# Patient Record
Sex: MALE | Race: WHITE
[De-identification: names, ages, dates, MRNs, and addresses within clinical notes are randomized per-mention and may not be internally consistent; named-entity substitution may affect disease eponyms.]

---

## 2020-10-25 ENCOUNTER — HOSPITAL ENCOUNTER (EMERGENCY)
Age: 40
Discharge: HOME | End: 2020-10-25
Payer: COMMERCIAL

## 2020-10-25 VITALS
HEART RATE: 73 BPM | SYSTOLIC BLOOD PRESSURE: 121 MMHG | DIASTOLIC BLOOD PRESSURE: 75 MMHG | TEMPERATURE: 98.24 F | RESPIRATION RATE: 16 BRPM

## 2020-10-25 VITALS
HEART RATE: 82 BPM | RESPIRATION RATE: 18 BRPM | TEMPERATURE: 98.5 F | SYSTOLIC BLOOD PRESSURE: 138 MMHG | DIASTOLIC BLOOD PRESSURE: 80 MMHG | OXYGEN SATURATION: 98 %

## 2020-10-25 VITALS — BODY MASS INDEX: 20.9 KG/M2

## 2020-10-25 DIAGNOSIS — L23.81: Primary | ICD-10-CM

## 2020-10-25 DIAGNOSIS — B88.0: ICD-10-CM

## 2020-10-25 PROCEDURE — 99281 EMR DPT VST MAYX REQ PHY/QHP: CPT

## 2024-04-29 ENCOUNTER — HOSPITAL ENCOUNTER (EMERGENCY)
Facility: HOSPITAL | Age: 44
Discharge: HOME | End: 2024-04-30
Attending: EMERGENCY MEDICINE
Payer: MEDICAID

## 2024-04-29 VITALS
WEIGHT: 170 LBS | RESPIRATION RATE: 19 BRPM | HEART RATE: 128 BPM | DIASTOLIC BLOOD PRESSURE: 80 MMHG | OXYGEN SATURATION: 99 % | HEIGHT: 72 IN | SYSTOLIC BLOOD PRESSURE: 142 MMHG | BODY MASS INDEX: 23.03 KG/M2 | TEMPERATURE: 98.7 F

## 2024-04-29 DIAGNOSIS — T40.2X1A OPIOID OVERDOSE, ACCIDENTAL OR UNINTENTIONAL, INITIAL ENCOUNTER: Primary | ICD-10-CM

## 2024-04-29 LAB
HOLD SPECIMEN: NORMAL
WHOLE BLOOD HOLD COAG: NORMAL
WHOLE BLOOD HOLD SPECIMEN: NORMAL

## 2024-04-29 PROCEDURE — 25010000002 ONDANSETRON PER 1 MG: Performed by: EMERGENCY MEDICINE

## 2024-04-29 PROCEDURE — 93005 ELECTROCARDIOGRAM TRACING: CPT | Performed by: EMERGENCY MEDICINE

## 2024-04-29 PROCEDURE — 96374 THER/PROPH/DIAG INJ IV PUSH: CPT

## 2024-04-29 PROCEDURE — 99283 EMERGENCY DEPT VISIT LOW MDM: CPT

## 2024-04-29 RX ORDER — SODIUM CHLORIDE 0.9 % (FLUSH) 0.9 %
10 SYRINGE (ML) INJECTION AS NEEDED
Status: DISCONTINUED | OUTPATIENT
Start: 2024-04-29 | End: 2024-04-30 | Stop reason: HOSPADM

## 2024-04-29 RX ORDER — ONDANSETRON 2 MG/ML
4 INJECTION INTRAMUSCULAR; INTRAVENOUS ONCE
Status: COMPLETED | OUTPATIENT
Start: 2024-04-29 | End: 2024-04-29

## 2024-04-29 RX ADMIN — ONDANSETRON 4 MG: 2 INJECTION INTRAMUSCULAR; INTRAVENOUS at 23:42

## 2024-04-30 NOTE — ED PROVIDER NOTES
Subjective   History of Present Illness  Is a 43-year-old male presented to the emergency department with some altered mental status.  Patient apparently used some fentanyl earlier today.  He states that he is only used it once before.  States that he snorted an unknown amount.  Patient was found by police slumped over his steering wheel after that.  Patient was administered Narcan and had brisk response.  He is alert and appropriate now.  States that he did not try to harm himself.  He was using recreationally.  He is having some mild nausea, however denies any other abnormalities.  Is not have any fevers or chills.  No headache or change in vision.  No focal weakness.  No chest pain or shortness of breath.  No abdominal pain.    History provided by:  Patient and police   used: No        Review of Systems   Constitutional:  Negative for chills and fever.   HENT:  Negative for congestion, ear pain and sore throat.    Eyes:  Negative for visual disturbance.   Respiratory:  Negative for shortness of breath.    Cardiovascular:  Negative for chest pain.   Gastrointestinal:  Positive for nausea. Negative for abdominal pain.   Genitourinary:  Negative for difficulty urinating.   Musculoskeletal:  Negative for arthralgias.   Skin:  Negative for rash.   Neurological:  Negative for dizziness, weakness and numbness.   Psychiatric/Behavioral:  Negative for agitation.        History reviewed. No pertinent past medical history.    No Known Allergies    History reviewed. No pertinent surgical history.    History reviewed. No pertinent family history.    Social History     Socioeconomic History    Marital status: Single           Objective   Physical Exam  Vitals and nursing note reviewed.   Constitutional:       General: He is not in acute distress.     Appearance: He is not ill-appearing or toxic-appearing.   HENT:      Mouth/Throat:      Pharynx: No posterior oropharyngeal erythema.   Eyes:      Extraocular  Movements: Extraocular movements intact.      Pupils: Pupils are equal, round, and reactive to light.   Cardiovascular:      Rate and Rhythm: Regular rhythm. Tachycardia present.   Pulmonary:      Effort: Pulmonary effort is normal. No respiratory distress.   Abdominal:      General: Abdomen is flat. There is no distension.      Palpations: There is no mass.      Tenderness: There is no abdominal tenderness. There is no guarding or rebound.   Musculoskeletal:         General: No deformity. Normal range of motion.   Neurological:      General: No focal deficit present.      Mental Status: He is alert.      Sensory: No sensory deficit.      Motor: No weakness.         ECG 12 Lead      Date/Time: 4/30/2024 12:32 AM    Performed by: Mario Rivas MD  Authorized by: Mario Rivas MD  Interpreted by ED physician  Previous ECG: no previous ECG available  Rhythm: sinus tachycardia  Rate: tachycardic  BPM: 114  QRS axis: normal  Conduction: conduction normal  Other findings comments: Nonspecific ST changes  Clinical impression: non-specific ECG  Comments: Interpretation:  EKG was directly visualized by myself, interpretations as documented in hospital course.               ED Course  ED Course as of 04/30/24 0035 Tue Apr 30, 2024   0034 BP: 142/80 [JK]   0034 Temp: 98.7 °F (37.1 °C) [JK]   0034 Temp src: Oral [JK]   0034 Heart Rate(!): 128 [JK]   0034 Resp: 19 [JK]   0034 SpO2: 99 %  Interpretation:  Patient's repeat vitals, telemetry tracing, and pulse oximetry tracing were directly viewed and interpreted by myself.  Sinus tachycardia [JK]   0034 On reevaluation, the patient is now hemodynamically stable.  Repeat neuroexam is normal.  Patient ambulatory.  We did have discussion about possible rehabilitation and the dangers of substance abuse.  I did offer evaluation by behavioral services.  Patient declines.  He is alert and appropriate at this time.  Full decision-making capability.  Patient be discharged  in stable condition.  Given strict return precautions.  Verbalized understanding. [JK]   0035 I had a discussion with the patient/family regarding diagnosis, diagnostic results, treatment plan, and medications. The patient/family indicated understanding of these instructions. I spent adequate time at the bedside prior to discharge necessary to discuss the aftercare instructions, giving patient education, providing explanations of the results of our evaluations/findings, and my decision making to assure that the patient/family understand the plan of care. Time was allotted to answer questions at that time and throughout the ED course. Patient is required to maintain timely follow up, as discussed. I also discussed the potential for the development of an acute emergent condition requiring further evaluation, return to the ER, admission, or even surgical intervention.  I encouraged the patient to return to the emergency department immediately for any concerns, worsening symptoms, new complaints, or if symptoms persist and they are unable to seek follow-up in a timely fashion. The patient/family expressed understanding and agreement with this plan    Shared decision making:   After full review of the patient's clinical presentation, review of any work-up including but not limited to laboratory studies and radiology obtained, I had a discussion with the patient.  Treatment options were discussed as well as the risks, benefits and consequences.  I discussed all findings with the patient and family members if available.  During the discussion, treatment goals were understood by all as well as any misconceptions which were addressed with the patient.  Ample time was given for any questions they may have had.  They are in agreement with the treatment plan as well as final disposition. [JK]      ED Course User Index  [JK] Mario Rivas MD                                             Medical Decision Making  This is a  43-year-old male presented to the emergency department with some altered mental status.  Patient is using intranasal fentanyl prior to arrival.  Patient received Narcan via [employer].  Is brought in for evaluation.  Patient complains of mild nausea, however no other symptoms.  He is mildly tachycardic.  Otherwise hemodynamically stable.  Afebrile.  Nontoxic-appearing.  Patient be placed on continuous telemetry monitoring.  Workup initiated.      Differential diagnosis: Accidental drug overdose, opiate use, syncope, nausea, gastritis      Amount and/or Complexity of Data Reviewed  ECG/medicine tests: ordered and independent interpretation performed. Decision-making details documented in ED Course.    Risk  Prescription drug management.        Final diagnoses:   Opioid overdose, accidental or unintentional, initial encounter       ED Disposition  ED Disposition       ED Disposition   Discharge    Condition   Stable    Comment   --               Ale Salter, APRN  [address] [facility] [address] [phone]    Call in 1 day      [facility] [address] [place] [address] [phone]  Call in 1 day           Medication List      No changes were made to your prescriptions during this visit.            Mario Rivas MD  04/30/24 0035

## 2024-05-01 LAB
QT INTERVAL: 296 MS
QTC INTERVAL: 407 MS